# Patient Record
Sex: FEMALE | Race: WHITE | NOT HISPANIC OR LATINO | Employment: OTHER | URBAN - METROPOLITAN AREA
[De-identification: names, ages, dates, MRNs, and addresses within clinical notes are randomized per-mention and may not be internally consistent; named-entity substitution may affect disease eponyms.]

---

## 2017-07-07 NOTE — PATIENT DISCUSSION
(H25.13) Age-related nuclear cataract, bilateral - Assesment : Examination revealed cataract. Pt's father with ocular cancer. No ocular tumors noted today. - Plan : Monitor for changes. Advised patient to call our office with decreased vision or increased symptoms. Updated GLRX and CLRx given today. RTC in 1 year for CL Exam, sooner if problems or changes occur.

## 2018-08-23 NOTE — PATIENT DISCUSSION
(H25.13) Age-related nuclear cataract, bilateral - Assesment : Examination revealed cataract. CL wearer. - Plan : Monitor for changes. Advised patient to call our office with decreased vision or increased symptoms. Updated CLRx given today. RTC in 1 year for CL Exam, sooner if problems or changes.

## 2018-08-23 NOTE — PATIENT DISCUSSION
(H35.899) Drusen (degenerative) of macula, bilateral - Assesment : Examination revealed rare Drusen. - Plan : Monitor for macular changes. Eat healthy and wear sunglasses. Advised patient to call our office with decreased vision or increased symptoms.

## 2019-11-25 ENCOUNTER — CATARACT CONSULT (OUTPATIENT)
Dept: URBAN - METROPOLITAN AREA CLINIC 35 | Facility: CLINIC | Age: 69
End: 2019-11-25

## 2019-11-25 DIAGNOSIS — H18.51: ICD-10-CM

## 2019-11-25 DIAGNOSIS — H25.811: ICD-10-CM

## 2019-11-25 DIAGNOSIS — H25.9: ICD-10-CM

## 2019-11-25 DIAGNOSIS — H25.812: ICD-10-CM

## 2019-11-25 DIAGNOSIS — H43.813: ICD-10-CM

## 2019-11-25 PROCEDURE — V2799PMN IMPRIMIS PRED-MOXI-NEPAF 5ML

## 2019-11-25 PROCEDURE — 92004 COMPRE OPH EXAM NEW PT 1/>: CPT

## 2019-11-25 PROCEDURE — 92015 DETERMINE REFRACTIVE STATE: CPT

## 2019-11-25 PROCEDURE — 92136TC INTERFEROMETRY - TECHNICAL COMPONENT

## 2019-11-25 ASSESSMENT — VISUAL ACUITY
OD_CC: 20/100
OD_PH: 20/40
OD_SC: J7
OS_PH: 20/40
OS_CC: 20/50
OS_SC: J7
OS_AM: 20/40
OD_CC: J4
OS_SC: 20/60
OD_SC: 20/100
OS_CC: J4

## 2019-11-25 ASSESSMENT — TONOMETRY
OD_IOP_MMHG: 14
OS_IOP_MMHG: 13

## 2019-12-12 ENCOUNTER — SURGERY/PROCEDURE (OUTPATIENT)
Dept: URBAN - METROPOLITAN AREA SURGERY 14 | Facility: SURGERY | Age: 69
End: 2019-12-12

## 2019-12-12 ENCOUNTER — H&P (OUTPATIENT)
Dept: URBAN - METROPOLITAN AREA CLINIC 39 | Facility: CLINIC | Age: 69
End: 2019-12-12

## 2019-12-12 DIAGNOSIS — H18.51: ICD-10-CM

## 2019-12-12 DIAGNOSIS — H25.9: ICD-10-CM

## 2019-12-12 DIAGNOSIS — H25.812: ICD-10-CM

## 2019-12-12 DIAGNOSIS — H25.811: ICD-10-CM

## 2019-12-12 PROCEDURE — 99211T TECH SERVICE

## 2019-12-12 PROCEDURE — 66984 XCAPSL CTRC RMVL W/O ECP: CPT

## 2019-12-13 ENCOUNTER — 1 DAY POST-OP (OUTPATIENT)
Dept: URBAN - METROPOLITAN AREA CLINIC 39 | Facility: CLINIC | Age: 69
End: 2019-12-13

## 2019-12-13 DIAGNOSIS — Z96.1: ICD-10-CM

## 2019-12-13 DIAGNOSIS — H25.812: ICD-10-CM

## 2019-12-13 DIAGNOSIS — H25.9: ICD-10-CM

## 2019-12-13 DIAGNOSIS — H18.51: ICD-10-CM

## 2019-12-13 PROCEDURE — 99024 POSTOP FOLLOW-UP VISIT: CPT

## 2019-12-13 ASSESSMENT — TONOMETRY: OD_IOP_MMHG: 10

## 2019-12-13 ASSESSMENT — VISUAL ACUITY: OD_SC: 20/50-1

## 2020-01-30 ENCOUNTER — SURGERY/PROCEDURE (OUTPATIENT)
Dept: URBAN - METROPOLITAN AREA SURGERY 14 | Facility: SURGERY | Age: 70
End: 2020-01-30

## 2020-01-30 ENCOUNTER — POST OP/EVAL OF SECOND EYE (OUTPATIENT)
Dept: URBAN - METROPOLITAN AREA CLINIC 39 | Facility: CLINIC | Age: 70
End: 2020-01-30

## 2020-01-30 DIAGNOSIS — H25.9: ICD-10-CM

## 2020-01-30 DIAGNOSIS — H18.51: ICD-10-CM

## 2020-01-30 DIAGNOSIS — H25.812: ICD-10-CM

## 2020-01-30 DIAGNOSIS — Z96.1: ICD-10-CM

## 2020-01-30 PROCEDURE — 99213 OFFICE O/P EST LOW 20 MIN: CPT

## 2020-01-30 PROCEDURE — 66984 XCAPSL CTRC RMVL W/O ECP: CPT

## 2020-01-30 ASSESSMENT — TONOMETRY
OD_IOP_MMHG: 13
OS_IOP_MMHG: 13

## 2020-01-30 ASSESSMENT — VISUAL ACUITY
OS_CC: 20/50
OS_SC: 20/60
OD_SC: 20/30-2
OS_BAT: 20/80

## 2020-02-04 ENCOUNTER — POST-OP CATARACT (OUTPATIENT)
Dept: URBAN - METROPOLITAN AREA CLINIC 35 | Facility: CLINIC | Age: 70
End: 2020-02-04

## 2020-02-04 DIAGNOSIS — H18.51: ICD-10-CM

## 2020-02-04 DIAGNOSIS — Z96.1: ICD-10-CM

## 2020-02-04 PROCEDURE — 99024 POSTOP FOLLOW-UP VISIT: CPT

## 2020-02-04 ASSESSMENT — VISUAL ACUITY
OD_SC: 20/40-1
OS_SC: 20/40-2

## 2020-02-04 ASSESSMENT — TONOMETRY: OS_IOP_MMHG: 14

## 2020-03-02 ENCOUNTER — POST-OP CATARACT (OUTPATIENT)
Dept: URBAN - METROPOLITAN AREA CLINIC 35 | Facility: CLINIC | Age: 70
End: 2020-03-02

## 2020-03-02 DIAGNOSIS — Z96.1: ICD-10-CM

## 2020-03-02 PROCEDURE — 99024 POSTOP FOLLOW-UP VISIT: CPT

## 2020-03-02 ASSESSMENT — VISUAL ACUITY
OD_SC: 20/40-1
OS_SC: 20/20-1

## 2020-03-02 ASSESSMENT — TONOMETRY
OS_IOP_MMHG: 12
OD_IOP_MMHG: 12

## 2020-06-03 ENCOUNTER — ESTABLISHED COMPREHENSIVE EXAM (OUTPATIENT)
Dept: URBAN - METROPOLITAN AREA CLINIC 35 | Facility: CLINIC | Age: 70
End: 2020-06-03

## 2020-06-03 DIAGNOSIS — H18.51: ICD-10-CM

## 2020-06-03 DIAGNOSIS — H43.813: ICD-10-CM

## 2020-06-03 DIAGNOSIS — H25.9: ICD-10-CM

## 2020-06-03 PROCEDURE — 92014 COMPRE OPH EXAM EST PT 1/>: CPT

## 2020-06-03 PROCEDURE — 92015 DETERMINE REFRACTIVE STATE: CPT

## 2020-06-03 ASSESSMENT — TONOMETRY
OD_IOP_MMHG: 13
OS_IOP_MMHG: 13

## 2020-06-03 ASSESSMENT — VISUAL ACUITY
OD_SC: J8
OS_SC: 20/25-2
OS_SC: J8
OD_SC: 20/30-2

## 2020-12-08 NOTE — PATIENT DISCUSSION
Discussed option of CE, considering new visual blur symptoms and slightly reduced VA OD. Pt defers surgery at this time.

## 2020-12-08 NOTE — PATIENT DISCUSSION
Pt taking preservision/AREDS 2 tablets per MD in [facility].

## 2021-06-07 ENCOUNTER — ESTABLISHED COMPREHENSIVE EXAM (OUTPATIENT)
Dept: URBAN - METROPOLITAN AREA CLINIC 35 | Facility: CLINIC | Age: 71
End: 2021-06-07

## 2021-06-07 DIAGNOSIS — H43.813: ICD-10-CM

## 2021-06-07 DIAGNOSIS — H18.519: ICD-10-CM

## 2021-06-07 DIAGNOSIS — H25.9: ICD-10-CM

## 2021-06-07 PROCEDURE — 92015 DETERMINE REFRACTIVE STATE: CPT

## 2021-06-07 PROCEDURE — 92014 COMPRE OPH EXAM EST PT 1/>: CPT

## 2021-06-07 ASSESSMENT — VISUAL ACUITY
OD_SC: J6
OD_SC: 20/50
OS_SC: 20/20
OD_PH: 20/30
OS_SC: J5

## 2021-06-07 ASSESSMENT — TONOMETRY
OD_IOP_MMHG: 14
OS_IOP_MMHG: 14

## 2021-12-09 NOTE — PATIENT DISCUSSION
recommend CE, considering new visual blur symptoms and reduced VA OD. Pt defers surgery at this time.

## 2022-06-13 ENCOUNTER — COMPREHENSIVE EXAM (OUTPATIENT)
Dept: URBAN - METROPOLITAN AREA CLINIC 35 | Facility: CLINIC | Age: 72
End: 2022-06-13

## 2022-06-13 DIAGNOSIS — Z96.1: ICD-10-CM

## 2022-06-13 DIAGNOSIS — H25.9: ICD-10-CM

## 2022-06-13 DIAGNOSIS — H26.493: ICD-10-CM

## 2022-06-13 DIAGNOSIS — H18.513: ICD-10-CM

## 2022-06-13 DIAGNOSIS — H43.813: ICD-10-CM

## 2022-06-13 PROCEDURE — 92015 DETERMINE REFRACTIVE STATE: CPT

## 2022-06-13 PROCEDURE — 92014 COMPRE OPH EXAM EST PT 1/>: CPT

## 2022-06-13 ASSESSMENT — TONOMETRY
OD_IOP_MMHG: 15
OS_IOP_MMHG: 15

## 2022-06-13 ASSESSMENT — VISUAL ACUITY
OS_SC: 20/25+1
OD_SC: J4
OS_SC: J6
OD_SC: 20/40-2
OD_PH: 20/25-2
OD_CC: J1
OS_CC: J1

## 2022-07-06 NOTE — PATIENT DISCUSSION
Local Anesthesia Pre Procedure Assessment    Informed Consent:    Consent Obtained: Yes       Procedure Assessment:          Planned Anesthetic: Local    Medical History/Comorbid Conditions:    Significant Medical/Surgical History: No  Normal Mental Status: Yes    Examination Pertinent to Procedure Being Performed:         Other Findings:    Reviewed Current Medications and Allergies: Yes    Pertinent Lab/Diagnostic Tests:    Pertinent Lab / Diagnostic Tests: None      Cathy Hernadez MD  8/19/2019     recommend CE, considering new visual blur symptoms and reduced VA OD. Pt defers surgery at this time.

## 2022-07-06 NOTE — PATIENT DISCUSSION
Pt understands brow ptosis contributes to eyelid asymmetry and it will not be corrected at time of BULB . May consider botox  .

## 2022-07-06 NOTE — PATIENT DISCUSSION
Pt symptomatic. Blepharoplasty Recommended. Explained eyebrow ptosis contributes to [facility] and eye lid asymmetry .

## 2022-07-06 NOTE — PATIENT DISCUSSION
Pt taking preservision/AREDS 2 tablets per MD in [facility].

## 2022-08-24 NOTE — PATIENT DISCUSSION
Pt symptomatic. Blepharoplasty Recommended. Explained eyebrow ptosis contributes to [facility] and eye lid asymmetry .

## 2022-08-24 NOTE — PATIENT DISCUSSION
Pt taking preservision/AREDS 2 tablets per MD in [facility].

## 2022-08-25 NOTE — PATIENT DISCUSSION
Pt symptomatic. Blepharoplasty Recommended. Explained eyebrow ptosis contributes to [facility] and eye lid asymmetry .

## 2022-08-25 NOTE — PATIENT DISCUSSION
Pt taking preservision/AREDS 2 tablets per MD in [facility].

## 2022-09-02 NOTE — PATIENT DISCUSSION
Pt symptomatic. Blepharoplasty Recommended. Explained eyebrow ptosis contributes to [facility] and eye lid asymmetry .

## 2022-09-02 NOTE — PATIENT DISCUSSION
Post op instructions reviewed with patients including the use of drops/DIANNA. May resume CL wear in 4-5 days.

## 2023-06-05 ENCOUNTER — COMPREHENSIVE EXAM (OUTPATIENT)
Dept: URBAN - METROPOLITAN AREA CLINIC 35 | Facility: CLINIC | Age: 73
End: 2023-06-05

## 2023-06-05 DIAGNOSIS — H18.513: ICD-10-CM

## 2023-06-05 DIAGNOSIS — H43.813: ICD-10-CM

## 2023-06-05 DIAGNOSIS — H26.493: ICD-10-CM

## 2023-06-05 DIAGNOSIS — H25.9: ICD-10-CM

## 2023-06-05 PROCEDURE — 92014 COMPRE OPH EXAM EST PT 1/>: CPT

## 2023-06-05 ASSESSMENT — TONOMETRY
OS_IOP_MMHG: 15
OD_IOP_MMHG: 15

## 2023-06-05 ASSESSMENT — VISUAL ACUITY
OS_SC: 20/20-2
OD_SC: 20/40

## 2024-06-10 ENCOUNTER — COMPREHENSIVE EXAM (OUTPATIENT)
Dept: URBAN - METROPOLITAN AREA CLINIC 35 | Facility: CLINIC | Age: 74
End: 2024-06-10

## 2024-06-10 DIAGNOSIS — H43.813: ICD-10-CM

## 2024-06-10 DIAGNOSIS — Z96.1: ICD-10-CM

## 2024-06-10 DIAGNOSIS — H26.493: ICD-10-CM

## 2024-06-10 DIAGNOSIS — H18.513: ICD-10-CM

## 2024-06-10 PROCEDURE — 99214 OFFICE O/P EST MOD 30 MIN: CPT

## 2024-06-10 PROCEDURE — 92015 DETERMINE REFRACTIVE STATE: CPT

## 2024-06-10 ASSESSMENT — VISUAL ACUITY
OS_SC: 20/30-2
OD_SC: J6
OS_SC: J6
OS_CC: J1
OD_SC: 20/40-2
OD_CC: J1

## 2024-06-10 ASSESSMENT — TONOMETRY
OS_IOP_MMHG: 12
OD_IOP_MMHG: 10

## 2025-06-20 ENCOUNTER — COMPREHENSIVE EXAM (OUTPATIENT)
Age: 75
End: 2025-06-20

## 2025-06-20 DIAGNOSIS — H18.513: ICD-10-CM

## 2025-06-20 DIAGNOSIS — H26.493: ICD-10-CM

## 2025-06-20 DIAGNOSIS — H43.813: ICD-10-CM

## 2025-06-20 DIAGNOSIS — H52.7: ICD-10-CM

## 2025-06-20 PROCEDURE — 92015 DETERMINE REFRACTIVE STATE: CPT

## 2025-06-20 PROCEDURE — 99214 OFFICE O/P EST MOD 30 MIN: CPT

## 2025-06-20 PROCEDURE — 92286 ANT SGM IMG I&R SPECLR MIC: CPT

## 2025-06-20 PROCEDURE — 76514 ECHO EXAM OF EYE THICKNESS: CPT
